# Patient Record
Sex: MALE | Race: WHITE | NOT HISPANIC OR LATINO | URBAN - METROPOLITAN AREA
[De-identification: names, ages, dates, MRNs, and addresses within clinical notes are randomized per-mention and may not be internally consistent; named-entity substitution may affect disease eponyms.]

---

## 2018-02-13 ENCOUNTER — FOLLOW UP ESTABLISHED (OUTPATIENT)
Dept: URBAN - METROPOLITAN AREA CLINIC 64 | Facility: CLINIC | Age: 53
End: 2018-02-13
Payer: COMMERCIAL

## 2018-02-13 PROCEDURE — 92014 COMPRE OPH EXAM EST PT 1/>: CPT | Performed by: OPTOMETRIST

## 2018-02-13 PROCEDURE — 92015 DETERMINE REFRACTIVE STATE: CPT | Performed by: OPTOMETRIST

## 2018-02-13 ASSESSMENT — KERATOMETRY
OD: 44.33
OS: 43.91

## 2018-02-13 ASSESSMENT — VISUAL ACUITY
OS: 20/20
OD: 20/20

## 2018-02-13 ASSESSMENT — INTRAOCULAR PRESSURE
OD: 13
OS: 11

## 2019-03-20 ENCOUNTER — FOLLOW UP ESTABLISHED (OUTPATIENT)
Dept: URBAN - METROPOLITAN AREA CLINIC 64 | Facility: CLINIC | Age: 54
End: 2019-03-20
Payer: COMMERCIAL

## 2019-03-20 DIAGNOSIS — H52.01 HYPERMETROPIA, RIGHT EYE: Primary | ICD-10-CM

## 2019-03-20 PROCEDURE — 92014 COMPRE OPH EXAM EST PT 1/>: CPT | Performed by: OPTOMETRIST

## 2019-03-20 PROCEDURE — 92015 DETERMINE REFRACTIVE STATE: CPT | Performed by: OPTOMETRIST

## 2019-03-20 ASSESSMENT — INTRAOCULAR PRESSURE
OS: 17
OD: 15

## 2019-03-20 ASSESSMENT — KERATOMETRY
OS: 44.17
OD: 44.50

## 2019-03-20 ASSESSMENT — VISUAL ACUITY
OS: 20/20
OD: 20/20

## 2021-02-10 ENCOUNTER — FOLLOW UP ESTABLISHED (OUTPATIENT)
Dept: URBAN - METROPOLITAN AREA CLINIC 64 | Facility: CLINIC | Age: 56
End: 2021-02-10
Payer: COMMERCIAL

## 2021-02-10 PROCEDURE — 92014 COMPRE OPH EXAM EST PT 1/>: CPT | Performed by: OPTOMETRIST

## 2021-02-10 ASSESSMENT — INTRAOCULAR PRESSURE
OS: 8
OD: 7

## 2021-02-10 ASSESSMENT — KERATOMETRY
OS: 44.15
OD: 44.38

## 2021-02-10 ASSESSMENT — VISUAL ACUITY
OD: 20/20
OS: 20/20

## 2022-02-10 ENCOUNTER — OFFICE VISIT (OUTPATIENT)
Dept: URBAN - METROPOLITAN AREA CLINIC 64 | Facility: CLINIC | Age: 57
End: 2022-02-10
Payer: COMMERCIAL

## 2022-02-10 DIAGNOSIS — H52.03 HYPERMETROPIA, BILATERAL: Primary | ICD-10-CM

## 2022-02-10 PROCEDURE — 92014 COMPRE OPH EXAM EST PT 1/>: CPT | Performed by: OPTOMETRIST

## 2022-02-10 ASSESSMENT — VISUAL ACUITY
OD: 20/20
OS: 20/20

## 2022-02-10 ASSESSMENT — KERATOMETRY
OD: 44.70
OS: 44.27

## 2022-02-10 ASSESSMENT — INTRAOCULAR PRESSURE
OD: 9
OS: 11

## 2022-02-10 NOTE — IMPRESSION/PLAN
Impression: Hypermetropia, bilateral: H52.03. Plan: Updated glasses Rx. Wears progressive safety glasses at work only. OTC readers at home.